# Patient Record
Sex: MALE | Race: WHITE | NOT HISPANIC OR LATINO | Employment: FULL TIME | URBAN - NONMETROPOLITAN AREA
[De-identification: names, ages, dates, MRNs, and addresses within clinical notes are randomized per-mention and may not be internally consistent; named-entity substitution may affect disease eponyms.]

---
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
## 2021-05-18 NOTE — PROGRESS NOTES
Jayson Barroso THALIA 2000     Office/Outpatient Visit    Visit Date: Sat, Oct 5, 2019 10:51 am    Provider: Mary Honeycutt N.P. (Assistant: Antonia Buck RN)    Location: [facility]        Electronically signed by Mary Honeycutt N.P. on  10/05/2019 11:38:56 AM                             SUBJECTIVE:        CC:     Mr. Barroso is a 19 year old White male.  Bump to left lower eyelid;         HPI:         PHQ-9 Depression Screening: Completed form scanned and in chart; Total Score 0     Jayson presents to clinic with c/o bump to left lower eyelid. Not painful. No vision changes. Has not noticed any changes to tear production. First noticed about one month ago. No treatments tried.     ROS:     CONSTITUTIONAL:  Negative for chills and fever.      EYES:  Positive for bump to left lower eyelid.   Negative for blurred vision, eye drainage, eye pain or photophobia.      E/N/T:  Negative for ear pain and sore throat.      CARDIOVASCULAR:  Negative for chest pain and palpitations.      RESPIRATORY:  Negative for dyspnea and frequent wheezing.      NEUROLOGICAL:  Negative for dizziness and headaches.      PSYCHIATRIC:  Negative for depression and suicidal thoughts.          PMH/FMH/SH:     Last Reviewed on 10/05/2019 11:09 AM by Mary Honeycutt    Past Medical History:             PAST MEDICAL HISTORY         Asthma: no problems for many years;     Allergies         Surgical History:         Other Surgeries:    rhinoplasty for nasal fracture;    wisdom teeth removal;         Family History:     Father: Myocardial Infarction     Mother: Healthy         Social History:     Occupation: [employer]. Student at [employer];     Marital Status: Single         Tobacco/Alcohol/Supplements:     Last Reviewed on 10/05/2019 10:58 AM by Antonia Buck    Tobacco: He has never smoked.  Non-drinker             Current Problems:     Last Reviewed on 6/04/2014 04:00 PM by Nguyen Henry    Other juvenile apophysitis      Intrinsic asthma, unspecified     Chalazion     Screening for depression         Immunizations:     DTaP 2001     DTaP 2001     DTaP 2001     DTaP 10/14/2002     DTaP 6/7/2005     Hib PRP-OMP (3-dose) 2001     Hib PRP-OMP (3-dose) 7/3/2002     Comvax (Hib-HepB) 2001     Hep B (pedi/adol, 3-dose schedule) 2001     Hep B (pedi/adol, 3-dose schedule) 7/3/2002     Menveo 6/15/2012     IPV  Poliovirus, inactivated 2001     IPV  Poliovirus, inactivated 2001     IPV  Poliovirus, inactivated 7/3/2002     IPV  Poliovirus, inactivated 6/7/2005     MMR  (Measles-Mumps-Rubella), live 10/14/2003     MMR  (Measles-Mumps-Rubella), live 10/7/2005     Varicella, live 7/3/2002     Varicella, live 5/5/2011     Prevnar (Pneumococcal PCV 7) 2001     Prevnar (Pneumococcal PCV 7) 2001     Prevnar (Pneumococcal PCV 7) 1/8/2002     Flulaval 12/26/2013     Fluzone (3 + years dose) 11/15/2006     Fluzone pf (3+ years dose) 10/5/2019     Influenza A (H1N1), IM (3+ years) Monovalent 11/4/2009     Influenza, split virus (3+ years dose) 10/7/2010     FluMist 11/29/2005     FluMist 11/21/2007     FluMist 11/5/2008     Adacel (Tdap) 6/15/2012         Allergies:     Last Reviewed on 10/05/2019 10:57 AM by Antonia Buck      No Known Drug Allergies.         Current Medications:     Last Reviewed on 10/05/2019 10:57 AM by Antonia Buck    Zyrtec 10mg Tablet Take 1 tab(s) by mouth qd         OBJECTIVE:        Vitals:         Current: 10/5/2019 11:00:34 AM    Ht:  5 ft, 8 in (30.64%);  Wt: 168.2 lbs (75.08%);  BMI: 25.6    T: 97.9 F (oral);  BP: 145/69 mm Hg (left arm, sitting);  P: 70 bpm (right arm (BP Cuff), sitting)        Exams:     PHYSICAL EXAM:     GENERAL: well developed, well nourished;  no apparent distress;     EYES: left lower lid chalazion;  extraocular movements intact; mildly pink bilateral conjunctival;  pupils and irises are normal;     RESPIRATORY: normal respiratory rate and  pattern with no distress; normal breath sounds with no rales, rhonchi, wheezes or rubs;     CARDIOVASCULAR: normal rate; rhythm is regular;     MUSCULOSKELETAL: normal gait;     NEUROLOGIC: mental status: alert and oriented x 3; GROSSLY INTACT     PSYCHIATRIC: appropriate affect and demeanor;         Lab/Test Results:     AUDIO AND VISUAL SCREENING     Vision Testing: Far Right 20/15 Left 20/13 Bilateral 20/13;         Procedures:     Vaccination against other viral diseases, Influenza     1. Influenza, seasonal PF (children 3 years to adult): 0.5 ml unit dose given IM in the right upper arm; administered by [facility] Regarding contraindications to an Influenza vaccine: Denies moderate/severe illness with/without fever; serious reaction to eggs, egg proteins, gentamicin, gelatin, arginine, neomycin or polymixin; serious reaction after recieving previous influenza vaccines; and history of Guillain-Parks Syndrome.              ASSESSMENT           373.2   H00.19  Chalazion              DDx:     V04.81   Z23  Vaccination against other viral diseases, Influenza              DDx:     V79.0   Z13.31  Screening for depression              DDx:         ORDERS:         Meds Prescribed:       Erythromycin Ophthalmic 0.5% Ophthalmic Ointment Apply 1/2 inch ribbon of ointment to affected eye(s) qid x 7 days  #3.5 (Three point Five) gm Refills: 0         Procedures Ordered:       70197  Immunization administration; one vaccine  (In-House)         11605  Screening test of visual acuity, quantitative, bilateral  (In-House)           Other Orders:       59837  Influenza virus vaccine, quadrivalent, split virus, preservative free 3 years of age & older  (In-House)           Depression screen negative  (In-House)                   PLAN:          Chalazion         RECOMMENDATIONS given include: Warm compresses to affected eye 4 times daily for 15 minutes with clean washcloth..  Will cover for bacterial infection with antibiotic  ointment. If no improvement with treatment after 1-2 weeks, recommend follow up with optometry. Be seen sooner for any concerning symptoms such as vision changes.     FOLLOW-UP: for Annual Checkup           Prescriptions:       Erythromycin Ophthalmic 0.5% Ophthalmic Ointment Apply 1/2 inch ribbon of ointment to affected eye(s) qid x 7 days  #3.5 (Three point Five) gm Refills: 0           Orders:       88843  Screening test of visual acuity, quantitative, bilateral  (In-House)            Vaccination against other viral diseases, Influenza           Orders:       42623  Immunization administration; one vaccine  (In-House)         53387  Influenza virus vaccine, quadrivalent, split virus, preservative free 3 years of age & older  (In-House)            Screening for depression     MIPS PHQ-9 Depression Screening: Completed form scanned and in chart; Total Score 0; Negative Depression Screen           Orders:         Depression screen negative  (In-House)               CHARGE CAPTURE           **Please note: ICD descriptions below are intended for billing purposes only and may not represent clinical diagnoses**        Primary Diagnosis:         373.2 Chalazion            H00.19    Chalazion unspecified eye, unspecified eyelid              Orders:          05524   Office visit - new pt, level 3  (In-House)             10209   Screening test of visual acuity, quantitative, bilateral  (In-House)           V04.81 Vaccination against other viral diseases, Influenza            Z23    Encounter for immunization              Orders:          67433   Immunization administration; one vaccine  (In-House)             38374   Influenza virus vaccine, quadrivalent, split virus, preservative free 3 years of age & older  (In-House)           V79.0 Screening for depression            Z13.31    Encounter for screening for depression              Orders:             Depression screen negative  (In-House)

## 2021-07-01 VITALS
SYSTOLIC BLOOD PRESSURE: 145 MMHG | HEIGHT: 68 IN | BODY MASS INDEX: 25.49 KG/M2 | WEIGHT: 168.2 LBS | DIASTOLIC BLOOD PRESSURE: 69 MMHG | TEMPERATURE: 97.9 F | HEART RATE: 70 BPM

## 2022-05-26 NOTE — PROGRESS NOTES
Chief Complaint        Lower abdominal pain, and diarrhea     History of Present Illness      Jayson Barroso is a 21 y.o. male who presents to [facility] as a new patient with a history of generalized abdominal pain, altered bowel habits, diarrhea, postprandial diarrhea, family history of colon cancer, family history of esophageal cancer and family history of celiac disease.  Patient reports his whole life that he has had postprandial diarrhea with IBS-like symptoms.  He reports in March that he developed a week of back pain, body aches nausea and diarrhea that worsened over a 1 week.  He was then seen in the emergency department at [facility].  Patient had a white blood cell count of 25.3 while in the emergency department.  Patient was diagnosed with COVID-19 while in the ER patient was given Toradol, Zofran and Dilaudid which provided relief of his pain.  Patient reports since that time he has not had abdominal pain but his bowel movements continued to be altered with 2-3 loose bowel movements per day.  He denies nocturnal diarrhea.  Patient denies fever, nausea, vomiting, weight loss, night sweats, melena, hematochezia, hematemesis.    CT abdomen and pelvis with contrast performed on 03/07/2022 unremarkable CT scan of the abdomen and pelvis.    Labs performed on 03/07/2022 positive for COVID, AST 17, ALT-50 , WBC-25.3, RBC 5.1, hemoglobin 15.5.    HIDA scan performed on 03/11/2022.  Normal.    Patient has never had a colonoscopy or EGD.     Results       Result Review :   The following data was reviewed by: JUAN Head on 05/27/2022                        Past Medical History       Past Medical History:   Diagnosis Date   • Allergies        Past Surgical History:   Procedure Laterality Date   • NOSE SURGERY           Current Outpatient Medications:   •  cetirizine (zyrTEC) 10 MG tablet, Take 10 mg by mouth Daily., Disp: , Rfl:   •  fluticasone (FLONASE) 50 MCG/ACT  "nasal spray, 2 sprays into the nostril(s) as directed by provider Daily., Disp: , Rfl:   •  dicyclomine (BENTYL) 20 MG tablet, Take 1 tablet by mouth Every 6 (Six) Hours., Disp: 90 tablet, Rfl: 3     No Known Allergies    Family History   Problem Relation Age of Onset   • Colon cancer Paternal Great-Grandfather         Social History     Social History Narrative   • Not on file       Objective       Objective     Vital Signs:   /63 (BP Location: Right arm, Patient Position: Sitting, Cuff Size: Adult)   Pulse 72   Ht 185.4 cm (73\")   Wt 76 kg (167 lb 9.6 oz)   SpO2 100%   BMI 22.11 kg/m²     Body mass index is 22.11 kg/m².    Physical Exam  Constitutional:       General: He is not in acute distress.     Appearance: Normal appearance. He is well-developed and normal weight.   Eyes:      Conjunctiva/sclera: Conjunctivae normal.      Pupils: Pupils are equal, round, and reactive to light.      Visual Fields: Right eye visual fields normal and left eye visual fields normal.   Cardiovascular:      Rate and Rhythm: Normal rate and regular rhythm.      Heart sounds: Normal heart sounds.   Pulmonary:      Effort: Pulmonary effort is normal. No retractions.      Breath sounds: Normal breath sounds and air entry.      Comments: Inspection of chest: normal appearance  Abdominal:      General: Bowel sounds are normal.      Palpations: Abdomen is soft.      Tenderness: There is no abdominal tenderness.      Comments: No appreciable hepatosplenomegaly   Musculoskeletal:      Cervical back: Neck supple.      Right lower leg: No edema.      Left lower leg: No edema.   Lymphadenopathy:      Cervical: No cervical adenopathy.   Skin:     Findings: No lesion.      Comments: Turgor normal   Neurological:      Mental Status: He is alert and oriented to person, place, and time.   Psychiatric:         Mood and Affect: Mood and affect normal.              Assessment & Plan          Assessment and Plan    Diagnoses and all orders " for this visit:    1. Generalized abdominal pain (Primary)  -     CBC & Differential; Future  -     Comprehensive Metabolic Panel; Future  -     Iron Profile; Future    2. Altered bowel habits  -     CBC & Differential; Future  -     Comprehensive Metabolic Panel; Future  -     Iron Profile; Future    3. Diarrhea, unspecified type  -     CBC & Differential; Future  -     Comprehensive Metabolic Panel; Future  -     Iron Profile; Future    4. Family history of colon cancer    5. Family history of esophageal cancer    6. Family history of celiac disease    Other orders  -     dicyclomine (BENTYL) 20 MG tablet; Take 1 tablet by mouth Every 6 (Six) Hours.  Dispense: 90 tablet; Refill: 3      21-year-old male presenting the office today as a new patient with a history of generalized abdominal pain, altered bowel habits, diarrhea, postprandial diarrhea, family history of colon cancer, family history of esophageal cancer and family history of celiac disease.  I have recommended that the patient undergo further evaluation with an EGD and colonoscopy.  I have discussed this procedure in detail with the patient.  I have discussed the risks, benefits and alternatives.  I have discussed the risk of anesthesia, bleeding and perforation.  Patient understands these risks, benefits and alternatives and wishes to proceed.  I will schedule him at his earliest convenience.  I have prescribed Bentyl 20 mg to use as needed for abdominal pain and altered bowel habits.  I have also recommended IBgard over-the-counter for use for IBS symptoms.  I have ordered repeat CBC along with a CMP and iron profile.  Patient will follow-up in the office after endoscopy.  Patient agreeable to this plan will call with any questions or concerns.            Follow Up       Follow Up   Return for Follow up after endoscopy in office.  Patient was given instructions and counseling regarding his condition or for health maintenance advice. Please see specific  information pulled into the AVS if appropriate.

## 2022-05-27 ENCOUNTER — PREP FOR SURGERY (OUTPATIENT)
Dept: OTHER | Facility: HOSPITAL | Age: 22
End: 2022-05-27

## 2022-05-27 ENCOUNTER — OFFICE VISIT (OUTPATIENT)
Dept: GASTROENTEROLOGY | Facility: CLINIC | Age: 22
End: 2022-05-27

## 2022-05-27 VITALS
BODY MASS INDEX: 22.21 KG/M2 | SYSTOLIC BLOOD PRESSURE: 129 MMHG | WEIGHT: 167.6 LBS | DIASTOLIC BLOOD PRESSURE: 63 MMHG | HEART RATE: 72 BPM | OXYGEN SATURATION: 100 % | HEIGHT: 73 IN

## 2022-05-27 DIAGNOSIS — R19.7 DIARRHEA, UNSPECIFIED TYPE: ICD-10-CM

## 2022-05-27 DIAGNOSIS — Z83.79 FAMILY HISTORY OF CELIAC DISEASE: ICD-10-CM

## 2022-05-27 DIAGNOSIS — R19.4 ALTERED BOWEL HABITS: ICD-10-CM

## 2022-05-27 DIAGNOSIS — R10.84 GENERALIZED ABDOMINAL PAIN: Primary | ICD-10-CM

## 2022-05-27 DIAGNOSIS — Z80.0 FAMILY HISTORY OF COLON CANCER: ICD-10-CM

## 2022-05-27 DIAGNOSIS — Z80.0 FAMILY HISTORY OF ESOPHAGEAL CANCER: ICD-10-CM

## 2022-05-27 PROCEDURE — 99214 OFFICE O/P EST MOD 30 MIN: CPT | Performed by: NURSE PRACTITIONER

## 2022-05-27 RX ORDER — DICYCLOMINE HCL 20 MG
20 TABLET ORAL EVERY 6 HOURS
Qty: 90 TABLET | Refills: 3 | Status: SHIPPED | OUTPATIENT
Start: 2022-05-27 | End: 2022-05-27

## 2022-05-27 RX ORDER — CETIRIZINE HYDROCHLORIDE 10 MG/1
10 TABLET ORAL DAILY
COMMUNITY
End: 2022-12-20

## 2022-05-27 RX ORDER — DICYCLOMINE HCL 20 MG
TABLET ORAL
Qty: 368 TABLET | Refills: 1 | Status: SHIPPED | OUTPATIENT
Start: 2022-05-27

## 2022-05-27 RX ORDER — FLUTICASONE PROPIONATE 50 MCG
2 SPRAY, SUSPENSION (ML) NASAL DAILY
COMMUNITY
End: 2022-12-20

## 2022-05-27 NOTE — PATIENT INSTRUCTIONS
Diarrhea, Adult  Diarrhea is frequent loose and watery bowel movements. Diarrhea can make you feel weak and cause you to become dehydrated. Dehydration can make you tired and thirsty, cause you to have a dry mouth, and decrease how often you urinate.  Diarrhea typically lasts 2-3 days. However, it can last longer if it is a sign of something more serious. It is important to treat your diarrhea as told by your health care provider.  Follow these instructions at home:  Eating and drinking         Follow these recommendations as told by your health care provider:  Take an oral rehydration solution (ORS). This is an over-the-counter medicine that helps return your body to its normal balance of nutrients and water. It is found at pharmacies and retail stores.  Drink plenty of fluids, such as water, ice chips, diluted fruit juice, and low-calorie sports drinks. You can drink milk also, if desired.  Avoid drinking fluids that contain a lot of sugar or caffeine, such as energy drinks, sports drinks, and soda.  Eat bland, easy-to-digest foods in small amounts as you are able. These foods include bananas, applesauce, rice, lean meats, toast, and crackers.  Avoid alcohol.  Avoid spicy or fatty foods.    Medicines  Take over-the-counter and prescription medicines only as told by your health care provider.  If you were prescribed an antibiotic medicine, take it as told by your health care provider. Do not stop using the antibiotic even if you start to feel better.  General instructions    Wash your hands often using soap and water. If soap and water are not available, use a hand . Others in the household should wash their hands as well. Hands should be washed:  After using the toilet or changing a diaper.  Before preparing, cooking, or serving food.  While caring for a sick person or while visiting someone in a hospital.  Drink enough fluid to keep your urine pale yellow.  Rest at home while you recover.  Watch your  condition for any changes.  Take a warm bath to relieve any burning or pain from frequent diarrhea episodes.  Keep all follow-up visits as told by your health care provider. This is important.    Contact a health care provider if:  You have a fever.  Your diarrhea gets worse.  You have new symptoms.  You cannot keep fluids down.  You feel light-headed or dizzy.  You have a headache.  You have muscle cramps.  Get help right away if:  You have chest pain.  You feel extremely weak or you faint.  You have bloody or black stools or stools that look like tar.  You have severe pain, cramping, or bloating in your abdomen.  You have trouble breathing or you are breathing very quickly.  Your heart is beating very quickly.  Your skin feels cold and clammy.  You feel confused.  You have signs of dehydration, such as:  Dark urine, very little urine, or no urine.  Cracked lips.  Dry mouth.  Sunken eyes.  Sleepiness.  Weakness.  Summary  Diarrhea is frequent loose and watery bowel movements. Diarrhea can make you feel weak and cause you to become dehydrated.  Drink enough fluids to keep your urine pale yellow.  Make sure that you wash your hands after using the toilet. If soap and water are not available, use hand .  Contact a health care provider if your diarrhea gets worse or you have new symptoms.  Get help right away if you have signs of dehydration.  This information is not intended to replace advice given to you by your health care provider. Make sure you discuss any questions you have with your health care provider.  Document Revised: 05/05/2020 Document Reviewed: 05/24/2019  [employer] Patient Education © 2021 [employer]

## 2022-05-31 ENCOUNTER — LAB (OUTPATIENT)
Dept: LAB | Facility: HOSPITAL | Age: 22
End: 2022-05-31

## 2022-05-31 DIAGNOSIS — R19.4 ALTERED BOWEL HABITS: ICD-10-CM

## 2022-05-31 DIAGNOSIS — R10.84 GENERALIZED ABDOMINAL PAIN: ICD-10-CM

## 2022-05-31 DIAGNOSIS — R19.7 DIARRHEA, UNSPECIFIED TYPE: ICD-10-CM

## 2022-05-31 LAB
ALBUMIN SERPL-MCNC: 4.6 G/DL (ref 3.5–5.2)
ALBUMIN/GLOB SERPL: 1.6 G/DL
ALP SERPL-CCNC: 58 U/L (ref 39–117)
ALT SERPL W P-5'-P-CCNC: 11 U/L (ref 1–41)
ANION GAP SERPL CALCULATED.3IONS-SCNC: 9.9 MMOL/L (ref 5–15)
AST SERPL-CCNC: 15 U/L (ref 1–40)
BASOPHILS # BLD AUTO: 0.05 10*3/MM3 (ref 0–0.2)
BASOPHILS NFR BLD AUTO: 0.7 % (ref 0–1.5)
BILIRUB SERPL-MCNC: 0.4 MG/DL (ref 0–1.2)
BUN SERPL-MCNC: 17 MG/DL (ref 6–20)
BUN/CREAT SERPL: 19.1 (ref 7–25)
CALCIUM SPEC-SCNC: 9.9 MG/DL (ref 8.6–10.5)
CHLORIDE SERPL-SCNC: 101 MMOL/L (ref 98–107)
CO2 SERPL-SCNC: 26.1 MMOL/L (ref 22–29)
CREAT SERPL-MCNC: 0.89 MG/DL (ref 0.76–1.27)
DEPRECATED RDW RBC AUTO: 39.1 FL (ref 37–54)
EGFRCR SERPLBLD CKD-EPI 2021: 125 ML/MIN/1.73
EOSINOPHIL # BLD AUTO: 0.32 10*3/MM3 (ref 0–0.4)
EOSINOPHIL NFR BLD AUTO: 4.4 % (ref 0.3–6.2)
ERYTHROCYTE [DISTWIDTH] IN BLOOD BY AUTOMATED COUNT: 12.1 % (ref 12.3–15.4)
GLOBULIN UR ELPH-MCNC: 2.8 GM/DL
GLUCOSE SERPL-MCNC: 86 MG/DL (ref 65–99)
HCT VFR BLD AUTO: 43.7 % (ref 37.5–51)
HGB BLD-MCNC: 14.7 G/DL (ref 13–17.7)
IMM GRANULOCYTES # BLD AUTO: 0.03 10*3/MM3 (ref 0–0.05)
IMM GRANULOCYTES NFR BLD AUTO: 0.4 % (ref 0–0.5)
IRON 24H UR-MRATE: 70 MCG/DL (ref 59–158)
IRON SATN MFR SERPL: 21 % (ref 20–50)
LYMPHOCYTES # BLD AUTO: 1.49 10*3/MM3 (ref 0.7–3.1)
LYMPHOCYTES NFR BLD AUTO: 20.3 % (ref 19.6–45.3)
MCH RBC QN AUTO: 29.8 PG (ref 26.6–33)
MCHC RBC AUTO-ENTMCNC: 33.6 G/DL (ref 31.5–35.7)
MCV RBC AUTO: 88.6 FL (ref 79–97)
MONOCYTES # BLD AUTO: 0.67 10*3/MM3 (ref 0.1–0.9)
MONOCYTES NFR BLD AUTO: 9.1 % (ref 5–12)
NEUTROPHILS NFR BLD AUTO: 4.78 10*3/MM3 (ref 1.7–7)
NEUTROPHILS NFR BLD AUTO: 65.1 % (ref 42.7–76)
PLATELET # BLD AUTO: 257 10*3/MM3 (ref 140–450)
PMV BLD AUTO: 9.2 FL (ref 6–12)
POTASSIUM SERPL-SCNC: 4.1 MMOL/L (ref 3.5–5.2)
PROT SERPL-MCNC: 7.4 G/DL (ref 6–8.5)
RBC # BLD AUTO: 4.93 10*6/MM3 (ref 4.14–5.8)
SODIUM SERPL-SCNC: 137 MMOL/L (ref 136–145)
TIBC SERPL-MCNC: 340 MCG/DL (ref 298–536)
TRANSFERRIN SERPL-MCNC: 228 MG/DL (ref 200–360)
WBC NRBC COR # BLD: 7.34 10*3/MM3 (ref 3.4–10.8)

## 2022-05-31 PROCEDURE — 83540 ASSAY OF IRON: CPT

## 2022-05-31 PROCEDURE — 84466 ASSAY OF TRANSFERRIN: CPT

## 2022-05-31 PROCEDURE — 36415 COLL VENOUS BLD VENIPUNCTURE: CPT

## 2022-05-31 PROCEDURE — 80053 COMPREHEN METABOLIC PANEL: CPT

## 2022-05-31 PROCEDURE — 85025 COMPLETE CBC W/AUTO DIFF WBC: CPT

## 2022-06-02 ENCOUNTER — PATIENT ROUNDING (BHMG ONLY) (OUTPATIENT)
Dept: GASTROENTEROLOGY | Facility: CLINIC | Age: 22
End: 2022-06-02

## 2022-06-02 NOTE — PROGRESS NOTES
6/2/2022      Hello, may I speak with Jayson Barroso     My name is Claire, Management Fellow. I am calling from [facility] [place].    Before we get started may I verify your date of birth? 2000    I am calling to officially welcome you to our practice and ask about your recent visit. Is this a good time to talk?  Yes    Tell me about your visit with us. What things went well?  Everyone was very welcoming and willing to listen.          We're always looking for ways to make our patients' experiences even better. Do you have recommendations on ways we may improve?  No ma'am.    Overall were you satisfied with your first visit to our practice?  Yes ma'am.    I appreciate you taking the time to speak with me today. Is there anything else I can do for you?  No ma'am.    We would really appreciate you completing a survey if you receive one in the mail or via email.       Thank you, and have a great day.

## 2022-06-07 ENCOUNTER — TELEPHONE (OUTPATIENT)
Dept: GASTROENTEROLOGY | Facility: CLINIC | Age: 22
End: 2022-06-07

## 2022-06-07 NOTE — TELEPHONE ENCOUNTER
----- Message from JUAN Head sent at 6/2/2022  8:01 AM EDT -----  Normal CMP with normal glucose, kidney function, electrolytes and liver function.  Normal iron profile.

## 2022-07-20 ENCOUNTER — TELEPHONE (OUTPATIENT)
Dept: GASTROENTEROLOGY | Facility: CLINIC | Age: 22
End: 2022-07-20

## 2022-07-20 RX ORDER — SOD SULF/POT CHLORIDE/MAG SULF 1.479 G
12 TABLET ORAL TAKE AS DIRECTED
Qty: 24 TABLET | Refills: 0 | Status: SHIPPED | OUTPATIENT
Start: 2022-07-20 | End: 2022-07-20

## 2022-07-20 RX ORDER — SOD SULF/POT CHLORIDE/MAG SULF 1.479 G
12 TABLET ORAL TAKE AS DIRECTED
Qty: 24 TABLET | Refills: 0 | Status: SHIPPED | OUTPATIENT
Start: 2022-07-20 | End: 2022-12-20

## 2022-07-25 NOTE — PRE-PROCEDURE INSTRUCTIONS
PT'S MOTHER INSTRUCTED ON CLEAR LIQ DIET AND PO SPLIT PREP LAST BM SHOULD BE CLEAR  PT CAN TAKE ZYRTEC   WITH A SIP OF WATER IN THE AM OF THE PROCEDURE ARRIVAL TIME IS 1000 AM  ON 7/26/22

## 2022-07-26 ENCOUNTER — HOSPITAL ENCOUNTER (OUTPATIENT)
Facility: HOSPITAL | Age: 22
Setting detail: HOSPITAL OUTPATIENT SURGERY
Discharge: HOME | End: 2022-07-26
Attending: INTERNAL MEDICINE | Admitting: INTERNAL MEDICINE

## 2022-07-26 ENCOUNTER — ANESTHESIA EVENT (OUTPATIENT)
Dept: GASTROENTEROLOGY | Facility: HOSPITAL | Age: 22
End: 2022-07-26

## 2022-07-26 ENCOUNTER — ANESTHESIA (OUTPATIENT)
Dept: GASTROENTEROLOGY | Facility: HOSPITAL | Age: 22
End: 2022-07-26

## 2022-07-26 VITALS
OXYGEN SATURATION: 100 % | TEMPERATURE: 98 F | BODY MASS INDEX: 22.85 KG/M2 | WEIGHT: 172.4 LBS | HEART RATE: 62 BPM | RESPIRATION RATE: 16 BRPM | HEIGHT: 73 IN | DIASTOLIC BLOOD PRESSURE: 70 MMHG | SYSTOLIC BLOOD PRESSURE: 117 MMHG

## 2022-07-26 DIAGNOSIS — R10.84 GENERALIZED ABDOMINAL PAIN: ICD-10-CM

## 2022-07-26 DIAGNOSIS — Z83.79 FAMILY HISTORY OF CELIAC DISEASE: ICD-10-CM

## 2022-07-26 DIAGNOSIS — Z80.0 FAMILY HISTORY OF ESOPHAGEAL CANCER: ICD-10-CM

## 2022-07-26 DIAGNOSIS — R19.7 DIARRHEA, UNSPECIFIED TYPE: ICD-10-CM

## 2022-07-26 DIAGNOSIS — R19.4 ALTERED BOWEL HABITS: ICD-10-CM

## 2022-07-26 DIAGNOSIS — Z80.0 FAMILY HISTORY OF COLON CANCER: ICD-10-CM

## 2022-07-26 PROCEDURE — 45385 COLONOSCOPY W/LESION REMOVAL: CPT | Performed by: INTERNAL MEDICINE

## 2022-07-26 PROCEDURE — 25010000002 PROPOFOL 10 MG/ML EMULSION: Performed by: NURSE ANESTHETIST, CERTIFIED REGISTERED

## 2022-07-26 PROCEDURE — 43239 EGD BIOPSY SINGLE/MULTIPLE: CPT | Performed by: INTERNAL MEDICINE

## 2022-07-26 PROCEDURE — 88305 TISSUE EXAM BY PATHOLOGIST: CPT | Performed by: INTERNAL MEDICINE

## 2022-07-26 PROCEDURE — 45380 COLONOSCOPY AND BIOPSY: CPT | Performed by: INTERNAL MEDICINE

## 2022-07-26 RX ORDER — PROPOFOL 10 MG/ML
VIAL (ML) INTRAVENOUS AS NEEDED
Status: DISCONTINUED | OUTPATIENT
Start: 2022-07-26 | End: 2022-07-26 | Stop reason: SURG

## 2022-07-26 RX ORDER — LIDOCAINE HYDROCHLORIDE 20 MG/ML
INJECTION, SOLUTION EPIDURAL; INFILTRATION; INTRACAUDAL; PERINEURAL AS NEEDED
Status: DISCONTINUED | OUTPATIENT
Start: 2022-07-26 | End: 2022-07-26 | Stop reason: SURG

## 2022-07-26 RX ORDER — SODIUM CHLORIDE, SODIUM LACTATE, POTASSIUM CHLORIDE, CALCIUM CHLORIDE 600; 310; 30; 20 MG/100ML; MG/100ML; MG/100ML; MG/100ML
30 INJECTION, SOLUTION INTRAVENOUS CONTINUOUS
Status: DISCONTINUED | OUTPATIENT
Start: 2022-07-26 | End: 2022-07-26 | Stop reason: HOSPADM

## 2022-07-26 RX ADMIN — SODIUM CHLORIDE, POTASSIUM CHLORIDE, SODIUM LACTATE AND CALCIUM CHLORIDE 30 ML/HR: 600; 310; 30; 20 INJECTION, SOLUTION INTRAVENOUS at 10:14

## 2022-07-26 RX ADMIN — PROPOFOL 100 MG: 10 INJECTION, EMULSION INTRAVENOUS at 10:38

## 2022-07-26 RX ADMIN — LIDOCAINE HYDROCHLORIDE 60 MG: 20 INJECTION, SOLUTION EPIDURAL; INFILTRATION; INTRACAUDAL; PERINEURAL at 10:38

## 2022-07-26 RX ADMIN — PROPOFOL 50 MG: 10 INJECTION, EMULSION INTRAVENOUS at 10:43

## 2022-07-26 RX ADMIN — PROPOFOL 150 MCG/KG/MIN: 10 INJECTION, EMULSION INTRAVENOUS at 10:38

## 2022-07-26 NOTE — H&P
"Pre Procedure History & Physical    Chief Complaint:   Abdominal pain  Altered bowel habits    Subjective     HPI:   As above    Past Medical History:   Past Medical History:   Diagnosis Date   • Allergies        Past Surgical History:  Past Surgical History:   Procedure Laterality Date   • NOSE SURGERY     • WISDOM TOOTH EXTRACTION         Family History:  Family History   Problem Relation Age of Onset   • Colon cancer Paternal Great-Grandfather    • Malig Hyperthermia Neg Hx        Social History:   reports that he has never smoked. He has never used smokeless tobacco. He reports current alcohol use. He reports that he does not use drugs.    Medications:   Medications Prior to Admission   Medication Sig Dispense Refill Last Dose   • Carboxymethylcellulose Sodium (EYE DROPS OP) Apply 1 drop to eye(s) as directed by provider Daily.   7/25/2022 at Unknown time   • cetirizine (zyrTEC) 10 MG tablet Take 10 mg by mouth Daily.   Past Week at Unknown time   • dicyclomine (BENTYL) 20 MG tablet TAKE 1 TABLET BY MOUTH EVERY 6 HOURS (Patient taking differently: Take 20 mg by mouth Every 6 (Six) Hours. NOT TAKING) 368 tablet 1 Past Week at Unknown time   • fluticasone (FLONASE) 50 MCG/ACT nasal spray 2 sprays into the nostril(s) as directed by provider Daily.   7/25/2022 at Unknown time   • Sodium Sulfate-Mag Sulfate-KCl (Sutab) 8051-482-042 MG tablet Take 12 tablets by mouth Take As Directed. 24 tablet 0 7/26/2022 at Unknown time       Allergies:  Patient has no known allergies.        Objective     Blood pressure 137/91, pulse 83, temperature 97.9 °F (36.6 °C), temperature source Temporal, resp. rate 18, height 185.4 cm (72.99\"), weight 78.2 kg (172 lb 6.4 oz), SpO2 100 %.    Physical Exam   Constitutional: Pt is oriented to person, place, and time and well-developed, well-nourished, and in no distress.   Mouth/Throat: Oropharynx is clear and moist.   Neck: Normal range of motion.   Cardiovascular: Normal rate, regular " rhythm and normal heart sounds.    Pulmonary/Chest: Effort normal and breath sounds normal.   Abdominal: Soft. Nontender  Skin: Skin is warm and dry.   Psychiatric: Mood, memory, affect and judgment normal.     Assessment & Plan     Diagnosis:  Abdominal pain, generalized  Change in bowel habits  diarrhea    Anticipated Surgical Procedure:  egd  colonoscopy    The risks, benefits, and alternatives of this procedure have been discussed with the patient or the responsible party- the patient understands and agrees to proceed.

## 2022-07-26 NOTE — ANESTHESIA POSTPROCEDURE EVALUATION
Patient: Jayson Barroso    Procedure Summary     Date: 07/26/22 Room / Location: [facility] END[facility]COPY 2 / [facility]    Anesthesia Start: 1033 Anesthesia Stop: 1108    Procedures:       ESOPHAGOGASTRODUODENOSCOPY WITH BIOPSIES (N/A )      COLONOSCOPY WITH COLD SNARE POLYPECTOMY AND RANDOM COLON BIOPSIES (N/A ) Diagnosis:       Generalized abdominal pain      Altered bowel habits      Diarrhea, unspecified type      Family history of colon cancer      Family history of esophageal cancer      Family history of celiac disease      (Generalized abdominal pain [R10.84])      (Altered bowel habits [R19.4])      (Diarrhea, unspecified type [R19.7])      (Family history of colon cancer [Z80.0])      (Family history of esophageal cancer [Z80.0])      (Family history of celiac disease [Z83.79])    Surgeons: Ronald Oquendo MD Provider: Chelita Kuo DO    Anesthesia Type: general ASA Status: 1          Anesthesia Type: general    Vitals  Vitals Value Taken Time   /70 07/26/22 1128   Temp 36.7 °C (98 °F) 07/26/22 1107   Pulse 60 07/26/22 1129   Resp 16 07/26/22 1128   SpO2 100 % 07/26/22 1129   Vitals shown include unvalidated device data.        Post Anesthesia Care and Evaluation    Patient location during evaluation: bedside  Patient participation: complete - patient participated  Level of consciousness: awake  Pain management: adequate    Airway patency: patent  Anesthetic complications: No anesthetic complications  PONV Status: none  Cardiovascular status: acceptable and stable  Respiratory status: acceptable  Hydration status: acceptable    Comments: An Anesthesiologist personally participated in the most demanding procedures (including induction and emergence if applicable) in the anesthesia plan, monitored the course of anesthesia administration at frequent intervals and remained physically present and available for immediate diagnosis and treatment of emergencies.

## 2022-07-26 NOTE — ANESTHESIA PREPROCEDURE EVALUATION
Anesthesia Evaluation     Patient summary reviewed and Nursing notes reviewed   no history of anesthetic complications:  NPO Solid Status: > 8 hours  NPO Liquid Status: < 2 hours           Airway   Mallampati: II  TM distance: >3 FB  No difficulty expected  Dental - normal exam     Pulmonary - negative pulmonary ROS and normal exam   Cardiovascular - negative cardio ROS and normal exam        Neuro/Psych- negative ROS  GI/Hepatic/Renal/Endo      ROS Comment: Abdominal pain    Musculoskeletal (-) negative ROS    Abdominal  - normal exam   Substance History - negative use     OB/GYN negative ob/gyn ROS         Other - negative ROS                       Anesthesia Plan    ASA 1     general     (Total IV Anesthesia    Patient understands anesthesia not responsible for dental damage.  )  intravenous induction     Anesthetic plan, risks, benefits, and alternatives have been provided, discussed and informed consent has been obtained with: patient.    Plan discussed with CRNA.        CODE STATUS:

## 2022-07-27 LAB
CYTO UR: NORMAL
LAB AP CASE REPORT: NORMAL
LAB AP CLINICAL INFORMATION: NORMAL
PATH REPORT.FINAL DX SPEC: NORMAL
PATH REPORT.GROSS SPEC: NORMAL

## 2022-11-04 ENCOUNTER — TELEPHONE (OUTPATIENT)
Dept: GASTROENTEROLOGY | Facility: CLINIC | Age: 22
End: 2022-11-04

## 2022-12-20 ENCOUNTER — OFFICE VISIT (OUTPATIENT)
Dept: FAMILY MEDICINE CLINIC | Age: 22
End: 2022-12-20

## 2022-12-20 VITALS
HEART RATE: 105 BPM | OXYGEN SATURATION: 100 % | TEMPERATURE: 98.6 F | BODY MASS INDEX: 21.81 KG/M2 | HEIGHT: 73 IN | DIASTOLIC BLOOD PRESSURE: 82 MMHG | WEIGHT: 164.6 LBS | SYSTOLIC BLOOD PRESSURE: 134 MMHG

## 2022-12-20 DIAGNOSIS — J34.89 RHINORRHEA: ICD-10-CM

## 2022-12-20 DIAGNOSIS — Z76.89 ENCOUNTER TO ESTABLISH CARE: Primary | ICD-10-CM

## 2022-12-20 DIAGNOSIS — R09.82 PND (POST-NASAL DRIP): ICD-10-CM

## 2022-12-20 PROCEDURE — 99213 OFFICE O/P EST LOW 20 MIN: CPT | Performed by: NURSE PRACTITIONER

## 2022-12-20 RX ORDER — CETIRIZINE HYDROCHLORIDE 10 MG/1
10 TABLET ORAL DAILY
Qty: 30 TABLET | Refills: 1 | Status: SHIPPED | OUTPATIENT
Start: 2022-12-20

## 2022-12-20 NOTE — PROGRESS NOTES
"Chief Complaint  Establish Care and Nasal Drainage (Pt states that as of yesterday he has had nasal drainage/)    Subjective          Jayson Barroso presents to [facility]  History of Present Illness  Here to establish care. It has been over 3 years since last seen by our practice.   URI   This is a new problem. The current episode started yesterday. There has been no fever. Associated symptoms include headaches (\"if so mild\"), a plugged ear sensation (\"maybe a little\"), rhinorrhea (clear), sneezing (\"a little\") and a sore throat (\"scratchy throat\" resolved). Pertinent negatives include no congestion, coughing, diarrhea, ear pain, nausea, sinus pain, swollen glands, vomiting or wheezing. Associated symptoms comments: Denies: myalgia. Treatments tried: Nyquil. The treatment provided mild relief.       Objective   Vital Signs:   /82 (BP Location: Left arm, Patient Position: Sitting)   Pulse 105   Temp 98.6 °F (37 °C) (Oral)   Ht 185.4 cm (72.99\")   Wt 74.7 kg (164 lb 9.6 oz)   SpO2 100% Comment: room air  BMI 21.72 kg/m²     Physical Exam  Constitutional:       Appearance: Normal appearance. He is normal weight.   HENT:      Head: Normocephalic.      Right Ear: Tympanic membrane, ear canal and external ear normal.      Left Ear: Tympanic membrane, ear canal and external ear normal.      Nose: Nose normal. No congestion.      Right Sinus: No maxillary sinus tenderness or frontal sinus tenderness.      Left Sinus: No maxillary sinus tenderness or frontal sinus tenderness.      Mouth/Throat:      Mouth: Mucous membranes are moist.      Pharynx: Oropharynx is clear. No oropharyngeal exudate or posterior oropharyngeal erythema.   Eyes:      Conjunctiva/sclera: Conjunctivae normal.      Pupils: Pupils are equal, round, and reactive to light.   Cardiovascular:      Rate and Rhythm: Normal rate and regular rhythm.      Pulses: Normal pulses.      Heart sounds: Normal heart sounds. "   Pulmonary:      Effort: Pulmonary effort is normal.      Breath sounds: Normal breath sounds.   Musculoskeletal:      Cervical back: Normal range of motion.   Lymphadenopathy:      Cervical: No cervical adenopathy.   Neurological:      Mental Status: He is alert and oriented to person, place, and time.   Psychiatric:         Mood and Affect: Mood normal.         Behavior: Behavior normal.         Thought Content: Thought content normal.        Result Review :   The following data was reviewed by: JUAN De León on 12/20/2022:                  Assessment and Plan    Diagnoses and all orders for this visit:    1. Encounter to establish care (Primary)    2. Rhinorrhea  -     cetirizine (zyrTEC) 10 MG tablet; Take 1 tablet by mouth Daily.  Dispense: 30 tablet; Refill: 1    3. PND (post-nasal drip)  -     cetirizine (zyrTEC) 10 MG tablet; Take 1 tablet by mouth Daily.  Dispense: 30 tablet; Refill: 1    I believe his symptoms may be more allergy related.  We will send in Zyrtec and see if that will help with his rhinorrhea.  If he develops fever and/or body aches, will put an order in for flu/COVID swab.      Follow Up   Return if symptoms worsen or fail to improve.  Patient was given instructions and counseling regarding his condition or for health maintenance advice. Please see specific information pulled into the AVS if appropriate.

## 2022-12-21 ENCOUNTER — CLINICAL SUPPORT (OUTPATIENT)
Dept: FAMILY MEDICINE CLINIC | Age: 22
End: 2022-12-21

## 2022-12-21 ENCOUNTER — TELEPHONE (OUTPATIENT)
Dept: FAMILY MEDICINE CLINIC | Age: 22
End: 2022-12-21

## 2022-12-21 DIAGNOSIS — J02.9 SORE THROAT: Primary | ICD-10-CM

## 2022-12-21 LAB
EXPIRATION DATE: ABNORMAL
EXPIRATION DATE: NORMAL
FLUAV AG UPPER RESP QL IA.RAPID: NOT DETECTED
FLUBV AG UPPER RESP QL IA.RAPID: NOT DETECTED
INTERNAL CONTROL: ABNORMAL
INTERNAL CONTROL: NORMAL
Lab: ABNORMAL
Lab: NORMAL
S PYO AG THROAT QL: NEGATIVE
SARS-COV-2 AG UPPER RESP QL IA.RAPID: DETECTED

## 2022-12-21 PROCEDURE — 87428 SARSCOV & INF VIR A&B AG IA: CPT | Performed by: NURSE PRACTITIONER

## 2022-12-21 PROCEDURE — 87081 CULTURE SCREEN ONLY: CPT | Performed by: NURSE PRACTITIONER

## 2022-12-21 PROCEDURE — 87880 STREP A ASSAY W/OPTIC: CPT | Performed by: NURSE PRACTITIONER

## 2022-12-21 NOTE — TELEPHONE ENCOUNTER
I placed an order for a strep, COVID, and flu swab, which can be done at his convenience. I may not be here to see the results, so if he's positive for strep, please forward results to on-call provider. Thanks.

## 2022-12-21 NOTE — TELEPHONE ENCOUNTER
Patient return for Covid, flu and strep. He was positive for Covid. Beatrice Lighton asked if you would look over and let us know if he would need to be treated if positive for anything. Please advise, thank you

## 2022-12-21 NOTE — TELEPHONE ENCOUNTER
Caller: Jayson Barroso    Relationship: Self    Best call back number: [phone]    What is the best time to reach you: ANY    Who are you requesting to speak with (clinical staff, provider,  specific staff member): CLINICAL    What was the call regarding: PATIENT STATES THAT HIS THROAT HAS GOTTEN WORSE FROM YESTERDAY. HE SAYS IT IS ALMOST UNBEARABLE PAIN.     Do you require a callback: YES

## 2022-12-23 LAB — BACTERIA SPEC AEROBE CULT: NORMAL

## 2023-02-28 ENCOUNTER — OFFICE VISIT (OUTPATIENT)
Dept: FAMILY MEDICINE CLINIC | Age: 23
End: 2023-02-28
Payer: COMMERCIAL

## 2023-02-28 VITALS
SYSTOLIC BLOOD PRESSURE: 136 MMHG | BODY MASS INDEX: 22.93 KG/M2 | WEIGHT: 173 LBS | HEART RATE: 97 BPM | HEIGHT: 73 IN | TEMPERATURE: 99.1 F | OXYGEN SATURATION: 100 % | DIASTOLIC BLOOD PRESSURE: 86 MMHG

## 2023-02-28 DIAGNOSIS — J02.9 SORE THROAT: Primary | ICD-10-CM

## 2023-02-28 DIAGNOSIS — R09.81 NASAL CONGESTION: ICD-10-CM

## 2023-02-28 LAB
EXPIRATION DATE: NORMAL
EXPIRATION DATE: NORMAL
FLUAV AG UPPER RESP QL IA.RAPID: NOT DETECTED
FLUBV AG UPPER RESP QL IA.RAPID: NOT DETECTED
INTERNAL CONTROL: NORMAL
INTERNAL CONTROL: NORMAL
Lab: NORMAL
Lab: NORMAL
S PYO AG THROAT QL: NEGATIVE
SARS-COV-2 AG UPPER RESP QL IA.RAPID: NOT DETECTED

## 2023-02-28 PROCEDURE — 87081 CULTURE SCREEN ONLY: CPT

## 2023-02-28 PROCEDURE — 87880 STREP A ASSAY W/OPTIC: CPT

## 2023-02-28 PROCEDURE — 99213 OFFICE O/P EST LOW 20 MIN: CPT

## 2023-02-28 PROCEDURE — 87428 SARSCOV & INF VIR A&B AG IA: CPT

## 2023-02-28 RX ORDER — AZELASTINE 1 MG/ML
1 SPRAY, METERED NASAL 2 TIMES DAILY
Qty: 30 ML | Refills: 0 | Status: SHIPPED | OUTPATIENT
Start: 2023-02-28

## 2023-03-01 RX ORDER — AZELASTINE 1 MG/ML
SPRAY, METERED NASAL
Qty: 90 ML | OUTPATIENT
Start: 2023-03-01

## 2023-03-02 LAB — BACTERIA SPEC AEROBE CULT: NORMAL

## 2023-03-06 ENCOUNTER — TELEPHONE (OUTPATIENT)
Dept: FAMILY MEDICINE CLINIC | Age: 23
End: 2023-03-06

## 2023-03-06 ENCOUNTER — OFFICE VISIT (OUTPATIENT)
Dept: FAMILY MEDICINE CLINIC | Age: 23
End: 2023-03-06
Payer: COMMERCIAL

## 2023-03-06 VITALS
WEIGHT: 174 LBS | HEART RATE: 79 BPM | BODY MASS INDEX: 23.06 KG/M2 | RESPIRATION RATE: 16 BRPM | SYSTOLIC BLOOD PRESSURE: 131 MMHG | HEIGHT: 73 IN | DIASTOLIC BLOOD PRESSURE: 66 MMHG

## 2023-03-06 DIAGNOSIS — H10.021 OTHER MUCOPURULENT CONJUNCTIVITIS OF RIGHT EYE: Primary | ICD-10-CM

## 2023-03-06 PROBLEM — H10.9 CONJUNCTIVITIS: Status: ACTIVE | Noted: 2023-03-06

## 2023-03-06 PROCEDURE — 99213 OFFICE O/P EST LOW 20 MIN: CPT | Performed by: NURSE PRACTITIONER

## 2023-03-06 RX ORDER — OLOPATADINE HYDROCHLORIDE 1 MG/ML
1 SOLUTION/ DROPS OPHTHALMIC 2 TIMES DAILY
COMMUNITY
End: 2023-03-08

## 2023-03-06 RX ORDER — ERYTHROMYCIN 5 MG/G
OINTMENT OPHTHALMIC EVERY 6 HOURS
Qty: 3.5 G | Refills: 0 | Status: SHIPPED | OUTPATIENT
Start: 2023-03-06

## 2023-03-06 RX ORDER — POLYMYXIN B SULFATE AND TRIMETHOPRIM 1; 10000 MG/ML; [USP'U]/ML
2 SOLUTION OPHTHALMIC 3 TIMES DAILY
Qty: 10 ML | Refills: 0 | Status: SHIPPED | OUTPATIENT
Start: 2023-03-06 | End: 2023-03-08

## 2023-03-06 NOTE — PROGRESS NOTES
Jayson Barroso presents to [facility].    Chief Complaint:  Eye Problem (Right eye, woke up this morning with gunk and crusty eye. Reports sent home from work. Is a  and uses allergy eye drops daily. )         History of Present Illness:  Right eye crusting   When did symptoms started this am  Any exposures: he is a   Wears corrective lens: none  Symptoms: red eye, and matted this am / no visual changes   Treatment tried: allergy eye drops    PMH changes:    Surgery:     EGD/ CLN 7-2022  Nasal surgery  [place] teeth extraction    Family history :     Father: DM  Mother:       Social:     Teacher at [employer]  Marital status: single  Children:  None       Review of Systems:  Review of Systems   Constitutional: Negative for fever.   HENT: Positive for congestion (last week, better now ).    Respiratory: Negative for cough and shortness of breath.    Cardiovascular: Negative for chest pain.          Current Outpatient Medications:   •  azelastine (ASTELIN) 0.1 % nasal spray, 1 spray into the nostril(s) as directed by provider 2 (Two) Times a Day. Use in each nostril as directed, Disp: 30 mL, Rfl: 0  •  cetirizine (zyrTEC) 10 MG tablet, Take 1 tablet by mouth Daily., Disp: 30 tablet, Rfl: 1  •  dicyclomine (BENTYL) 20 MG tablet, TAKE 1 TABLET BY MOUTH EVERY 6 HOURS (Patient taking differently: Take 1 tablet by mouth Every 6 (Six) Hours. NOT TAKING), Disp: 368 tablet, Rfl: 1  •  olopatadine (PATANOL) 0.1 % ophthalmic solution, 1 drop 2 (Two) Times a Day., Disp: , Rfl:   •  SAMBUCOL BLACK ELDERBERRY PO, Take  by mouth., Disp: , Rfl:   •  trimethoprim-polymyxin b (Polytrim) 15015-2.1 UNIT/ML-% ophthalmic solution, Administer 2 drops to the right eye 3 (Three) Times a Day., Disp: 10 mL, Rfl: 0    Vital Signs:   Vitals:    03/06/23 0928   BP: 131/66   BP Location: Right arm   Patient Position: Sitting   Cuff Size: Adult   Pulse: 79   Resp: 16   Weight: 78.9 kg (174 lb)  "  Height: 185.4 cm (72.99\")   PainSc: 0-No pain         Physical Exam:  Physical Exam  Constitutional:       General: He is not in acute distress.     Appearance: Normal appearance.   HENT:      Right Ear: Tympanic membrane, ear canal and external ear normal.      Left Ear: Tympanic membrane, ear canal and external ear normal.      Nose: Congestion present.      Mouth/Throat:      Pharynx: Oropharynx is clear. No posterior oropharyngeal erythema.   Eyes:      General:         Right eye: Discharge (none seen but erythema present ) present.   Cardiovascular:      Rate and Rhythm: Normal rate and regular rhythm.      Heart sounds: No murmur heard.  Pulmonary:      Effort: Pulmonary effort is normal.      Breath sounds: Normal breath sounds.   Lymphadenopathy:      Cervical: No cervical adenopathy.   Neurological:      Mental Status: He is alert.   Psychiatric:         Mood and Affect: Mood normal.         Behavior: Behavior normal.         Result Review      The following data was reviewed by: JUAN Harden on 03/06/2023:    Results for orders placed or performed in visit on 02/28/23   Beta Strep Culture, Throat - Swab, Throat    Specimen: Throat; Swab   Result Value Ref Range    Throat Culture, Beta Strep No Beta Hemolytic Streptococcus Isolated    POCT SARS-CoV-2 Antigen CYDNEY + Flu    Specimen: Swab   Result Value Ref Range    SARS Antigen Not Detected Not Detected, Presumptive Negative    Influenza A Antigen CYDNEY Not Detected Not Detected    Influenza B Antigen CYDNEY Not Detected Not Detected    Internal Control Passed Passed    Lot Number [id number]     Expiration Date 11,192,023    POCT rapid strep A    Specimen: Swab   Result Value Ref Range    Rapid Strep A Screen Negative Negative, VALID, INVALID, Not Performed    Internal Control Passed Passed    Lot Number [id number]     Expiration Date 5,312,024                Assessment and Plan:          Diagnoses and all orders for this visit:    1. Other mucopurulent " conjunctivitis of right eye (Primary)  Assessment & Plan:  Cover work for today, use warm compresses, continue his allergy treatments     Orders:  -     trimethoprim-polymyxin b (Polytrim) 34444-0.1 UNIT/ML-% ophthalmic solution; Administer 2 drops to the right eye 3 (Three) Times a Day.  Dispense: 10 mL; Refill: 0        Follow Up   Return if symptoms worsen or fail to improve.  Patient was given instructions and counseling regarding his condition or for health maintenance advice. Please see specific information pulled into the AVS if appropriate.

## 2023-03-06 NOTE — TELEPHONE ENCOUNTER
Pt said the eye medication A Butler sent to the pharmacy they dont have.  He said she could send it to [facility].

## 2023-03-07 ENCOUNTER — TELEPHONE (OUTPATIENT)
Dept: FAMILY MEDICINE CLINIC | Age: 23
End: 2023-03-07
Payer: COMMERCIAL

## 2023-03-07 NOTE — TELEPHONE ENCOUNTER
Caller: Jayson Barroso    Relationship: Self    Best call back number: [phone]    What medication are you requesting: EYE DROPS   What are your current symptoms: PINK EYE     How long have you been experiencing symptoms: A COUPLE     Have you had these symptoms before:    [x] Yes  [] No    Have you been treated for these symptoms before:   [x] Yes  [] No    If a prescription is needed, what is your preferred pharmacy and phone number:  PATIENT STATED HE HAS CALLED A COUPLE DIFFERENT LOCATIONS BUT ALL PHARMACIES ARE ON BACK ORDER. HE IS WANTING TO KNOW IF SANDI HAS ANY RECOMMENDATIONS FOR WHERE HE COULD GET THESE AT. HE STATED THE OINTMENT IS NOT HELPING AT ALL.

## 2023-03-07 NOTE — TELEPHONE ENCOUNTER
Pt just started the eye oint last night at 7pm.  He said he is not better and thinks it needs to be changed.  Explained to him that it will take longer than one day to improve.  Give it a little longer to see if it helps.

## 2023-03-08 ENCOUNTER — OFFICE VISIT (OUTPATIENT)
Dept: FAMILY MEDICINE CLINIC | Age: 23
End: 2023-03-08
Payer: COMMERCIAL

## 2023-03-08 VITALS
SYSTOLIC BLOOD PRESSURE: 129 MMHG | WEIGHT: 173 LBS | HEIGHT: 73 IN | BODY MASS INDEX: 22.93 KG/M2 | OXYGEN SATURATION: 100 % | HEART RATE: 76 BPM | TEMPERATURE: 98.2 F | DIASTOLIC BLOOD PRESSURE: 79 MMHG

## 2023-03-08 DIAGNOSIS — H10.33 ACUTE BACTERIAL CONJUNCTIVITIS OF BOTH EYES: Primary | ICD-10-CM

## 2023-03-08 PROCEDURE — 99213 OFFICE O/P EST LOW 20 MIN: CPT

## 2023-03-08 NOTE — PROGRESS NOTES
"Subjective     CHIEF COMPLAINT    Chief Complaint   Patient presents with   • Conjunctivitis     Pain in both eyes, was in left and now in the right      History of Present Illness  Patient is a 22-year-old male who presents to the clinic today with complaints of redness and discharge to his eyes.  He was seen in clinic on 3-6-2023 by Loyda Butler and was diagnosed with conjunctivitis of the right eye. he was prescribed Polytrim but then was informed by the pharmacy that this is on back order.  Loyda Butler then sent in erythromycin ointment for him.  He has been taking this since Monday night.  Reports that he woke up this morning and and his left eye is now red and also draining.  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  He does not wear contacts or glasses.  Denies any visual changes.  Denies fever, chills, cough or congestion, denies nausea or vomiting..  Denies any foreign body sensation.  States his eyes feel \"really itchy\" and \"weighed down.\"  His eyes are crusted shut in the mornings.    Review of Systems   Constitutional: Negative for chills and fever.   Eyes: Positive for discharge, redness and itching. Negative for photophobia and visual disturbance.   Respiratory: Negative for cough, shortness of breath and wheezing.    Cardiovascular: Negative for chest pain.   Gastrointestinal: Negative for nausea and vomiting.     No Known Allergies    Current Outpatient Medications on File Prior to Visit   Medication Sig Dispense Refill   • azelastine (ASTELIN) 0.1 % nasal spray 1 spray into the nostril(s) as directed by provider 2 (Two) Times a Day. Use in each nostril as directed 30 mL 0   • cetirizine (zyrTEC) 10 MG tablet Take 1 tablet by mouth Daily. 30 tablet 1   • dicyclomine (BENTYL) 20 MG tablet TAKE 1 TABLET BY MOUTH EVERY 6 HOURS (Patient taking differently: Take 1 tablet by mouth Every 6 (Six) Hours. NOT TAKING) 368 " "tablet 1   • erythromycin (ROMYCIN) 5 MG/GM ophthalmic ointment Administer  to the right eye Every 6 (Six) Hours. 3.5 g 0   • SAMBUCOL BLACK ELDERBERRY PO Take  by mouth.     • [DISCONTINUED] olopatadine (PATANOL) 0.1 % ophthalmic solution 1 drop 2 (Two) Times a Day.     • [DISCONTINUED] trimethoprim-polymyxin b (Polytrim) 05409-1.1 UNIT/ML-% ophthalmic solution Administer 2 drops to the right eye 3 (Three) Times a Day. (Patient taking differently: Administer 2 drops to the right eye 3 (Three) Times a Day. Is on the national back order) 10 mL 0     No current facility-administered medications on file prior to visit.     /79 (BP Location: Right arm, Patient Position: Sitting, Cuff Size: Adult)   Pulse 76   Temp 98.2 °F (36.8 °C) (Oral)   Ht 185.4 cm (72.99\")   Wt 78.5 kg (173 lb)   SpO2 100%   BMI 22.83 kg/m²     Objective     Physical Exam  Vitals and nursing note reviewed.   Constitutional:       General: He is not in acute distress.     Appearance: Normal appearance. He is not ill-appearing.   HENT:      Head: Normocephalic.   Eyes:      General:         Right eye: No discharge.         Left eye: Discharge present.     Extraocular Movements: Extraocular movements intact.      Conjunctiva/sclera:      Right eye: Right conjunctiva is injected. No hemorrhage.     Left eye: Left conjunctiva is injected. No hemorrhage.  Cardiovascular:      Rate and Rhythm: Normal rate and regular rhythm.      Heart sounds: Normal heart sounds. No murmur heard.  Pulmonary:      Effort: Pulmonary effort is normal. No accessory muscle usage or respiratory distress.      Breath sounds: Normal breath sounds. No wheezing or rhonchi.   Musculoskeletal:      Cervical back: Normal range of motion.   Skin:     General: Skin is warm and dry.   Neurological:      General: No focal deficit present.      Mental Status: He is alert and oriented to person, place, and time.   Psychiatric:         Mood and Affect: Mood and affect normal.    "      Behavior: Behavior normal.             Diagnoses and all orders for this visit:    1. Acute bacterial conjunctivitis of both eyes (Primary)      It appears that conjunctivitis has spread from the right eye to the left eye.  However given that patient does not feel he is seeing much improvement, recommend further evaluation at the eye doctor just to ensure there is nothing else going on. Patient is agreeable. We were able to get him scheduled with [facility] today at 9am. Will defer further treatment plan to [facility].     Follow-up:  Return if symptoms worsen or fail to improve.  Patient was given instructions and counseling regarding his condition or for health maintenance advice. Please see specific information pulled into the AVS if appropriate.

## 2024-02-01 ENCOUNTER — OFFICE VISIT (OUTPATIENT)
Dept: FAMILY MEDICINE CLINIC | Age: 24
End: 2024-02-01
Payer: COMMERCIAL

## 2024-02-01 VITALS
HEIGHT: 73 IN | OXYGEN SATURATION: 98 % | HEART RATE: 92 BPM | TEMPERATURE: 98.2 F | DIASTOLIC BLOOD PRESSURE: 74 MMHG | SYSTOLIC BLOOD PRESSURE: 128 MMHG | BODY MASS INDEX: 24.23 KG/M2 | WEIGHT: 182.8 LBS

## 2024-02-01 DIAGNOSIS — R68.83 CHILLS: ICD-10-CM

## 2024-02-01 DIAGNOSIS — R19.7 DIARRHEA, UNSPECIFIED TYPE: Primary | ICD-10-CM

## 2024-02-01 LAB
EXPIRATION DATE: NORMAL
FLUAV AG UPPER RESP QL IA.RAPID: NOT DETECTED
FLUBV AG UPPER RESP QL IA.RAPID: NOT DETECTED
INTERNAL CONTROL: NORMAL
Lab: NORMAL
SARS-COV-2 AG UPPER RESP QL IA.RAPID: NOT DETECTED

## 2024-02-01 PROCEDURE — 87428 SARSCOV & INF VIR A&B AG IA: CPT | Performed by: PHYSICIAN ASSISTANT

## 2024-02-01 PROCEDURE — 99213 OFFICE O/P EST LOW 20 MIN: CPT | Performed by: PHYSICIAN ASSISTANT

## 2024-02-01 RX ORDER — OLOPATADINE HYDROCHLORIDE 1 MG/ML
1 SOLUTION/ DROPS OPHTHALMIC 2 TIMES DAILY
COMMUNITY

## 2024-02-01 NOTE — PROGRESS NOTES
Subjective     CHIEF COMPLAINT    Chief Complaint   Patient presents with    Chills     Pt c/o chills, body aches, headache, sweats, diarrhea.    X 2 days               History of Present Illness  This is a 23-year-old male presenting the clinic complaining of diarrhea, body aches and headache for the last 2 days.  He had chills, sweats and subjective fevers the first night of his symptoms.  Today he has had abdominal cramping and diarrhea.  He continues to have a headache.  Denies any known sick contacts recently.  He is a .            Review of Systems   Constitutional:  Positive for chills and fatigue. Negative for fever.   HENT:  Negative for congestion, rhinorrhea and sore throat.    Respiratory:  Negative for cough.    Gastrointestinal:  Positive for abdominal pain and diarrhea. Negative for nausea and vomiting.   Musculoskeletal:  Positive for myalgias.   Neurological:  Positive for headaches.            Past Medical History:   Diagnosis Date    Allergies             Past Surgical History:   Procedure Laterality Date    COLONOSCOPY N/A 7/26/2022    Procedure: COLONOSCOPY WITH COLD SNARE POLYPECTOMY AND RANDOM COLON BIOPSIES;  Surgeon: Ronald Oquendo MD;  Location: [facility];  Service: Gastroenterology;  Laterality: N/A;  COLON POLYP    ENDOSCOPY N/A 7/26/2022    Procedure: ESOPHAGOGASTRODUODENOSCOPY WITH BIOPSIES;  Surgeon: Ronald Oquendo MD;  Location: [facility] END[facility];  Service: Gastroenterology;  Laterality: N/A;  NORMAL EGD    NOSE SURGERY      WISDOM TOOTH EXTRACTION              Family History   Problem Relation Age of Onset    Diabetes Father     Colon cancer Paternal Great-Grandfather     Malig Hyperthermia Neg Hx             Social History     Socioeconomic History    Marital status: Single   Tobacco Use    Smoking status: Never    Smokeless tobacco: Never    Tobacco comments:     NO SECOND HAND SMOKE EXPOSURE   Vaping Use    Vaping Use: Never used   Substance and  "Sexual Activity    Alcohol use: Yes     Comment: VERY RARELY    Drug use: Never    Sexual activity: Defer            No Known Allergies         Current Outpatient Medications on File Prior to Visit   Medication Sig Dispense Refill    cetirizine (zyrTEC) 10 MG tablet Take 1 tablet by mouth Daily. 30 tablet 1    olopatadine (Pataday) 0.1 % ophthalmic solution 1 drop 2 (Two) Times a Day.      azelastine (ASTELIN) 0.1 % nasal spray 1 spray into the nostril(s) as directed by provider 2 (Two) Times a Day. Use in each nostril as directed (Patient not taking: Reported on 2/1/2024) 30 mL 0    dicyclomine (BENTYL) 20 MG tablet TAKE 1 TABLET BY MOUTH EVERY 6 HOURS (Patient not taking: Reported on 2/1/2024) 368 tablet 1    SAMBUCOL BLACK ELDERBERRY PO Take  by mouth. (Patient not taking: Reported on 2/1/2024)      [DISCONTINUED] erythromycin (ROMYCIN) 5 MG/GM ophthalmic ointment Administer  to the right eye Every 6 (Six) Hours. (Patient not taking: Reported on 2/1/2024) 3.5 g 0     No current facility-administered medications on file prior to visit.            /74 (BP Location: Left arm, Patient Position: Sitting)   Pulse 92   Temp 98.2 °F (36.8 °C) (Oral)   Ht 185.4 cm (72.99\")   Wt 82.9 kg (182 lb 12.8 oz)   SpO2 98% Comment: room air  BMI 24.12 kg/m²          Objective     Physical Exam  Vitals and nursing note reviewed.   Constitutional:       General: He is not in acute distress.     Appearance: Normal appearance.   HENT:      Head: Normocephalic and atraumatic.   Eyes:      General: No scleral icterus.     Conjunctiva/sclera: Conjunctivae normal.   Cardiovascular:      Rate and Rhythm: Normal rate and regular rhythm.      Heart sounds: Normal heart sounds.   Pulmonary:      Effort: Pulmonary effort is normal. No respiratory distress.      Breath sounds: Normal breath sounds.   Abdominal:      General: Bowel sounds are normal. There is no distension.      Palpations: Abdomen is soft.      Tenderness: There is " no abdominal tenderness. There is no guarding or rebound.   Skin:     General: Skin is warm and dry.   Neurological:      Mental Status: He is alert and oriented to person, place, and time.   Psychiatric:         Mood and Affect: Mood normal.         Behavior: Behavior normal.                     Lab Results (last 24 hours)       Procedure Component Value Units Date/Time    POCT SARS-CoV-2 Antigen CYDNEY + Flu [[id number]] Collected: 02/01/24 1243    Specimen: Swab Updated: 02/01/24 1243     SARS Antigen Not Detected     Influenza A Antigen CYDNEY Not Detected     Influenza B Antigen CYDNEY Not Detected     Internal Control Passed     Lot Number [id number]     Expiration Date 9/14/24                      Assessment & Plan  Diarrhea, unspecified type  Jayson has not had diarrhea for less than 24 hours.  He has associated viral symptoms such as headache, body aches, subjective fever and chills.  Recommend supportive care with increased fluids and rest.  Provided note for work.  He is to contact the office if his symptoms do not resolve in 4 to 5 days or if he is feeling worse.  Chills      Orders Placed This Encounter   Procedures    POCT SARS-CoV-2 Antigen CYDNEY + Flu                   FOR FULL DISCHARGE INSTRUCTIONS/COMMENTS/HANDOUTS please see the   AVS

## 2024-02-01 NOTE — ASSESSMENT & PLAN NOTE
Jayson has not had diarrhea for less than 24 hours.  He has associated viral symptoms such as headache, body aches, subjective fever and chills.  Recommend supportive care with increased fluids and rest.  Provided note for work.  He is to contact the office if his symptoms do not resolve in 4 to 5 days or if he is feeling worse.

## 2024-02-01 NOTE — LETTER
February 1, 2024     Patient: Jayson Barroso   YOB: 2000   Date of Visit: 2/1/2024       To Whom It May Concern:    It is my medical opinion that Jayson Barroso may return to work in two days. If symptoms have improved he may return sooner.          Sincerely,          Maddie Valdes PA-C    CC: No Recipients

## 2024-02-02 ENCOUNTER — TELEPHONE (OUTPATIENT)
Dept: FAMILY MEDICINE CLINIC | Age: 24
End: 2024-02-02
Payer: COMMERCIAL

## 2024-02-02 NOTE — TELEPHONE ENCOUNTER
Caller: MARIPOSA LEE    Relationship to patient: Mother    Best call back number: [phone]    Patient is needing: PATIENT'S MOTHER CALLED IN AND IS REQUESTING A CALL BACK FROM CLINICAL STAFF. PATIENT WAS SEEN YESTERDAY AND IS NOT FEELING BETTER. PATIENT'S MOTHER WOULD LIKE TO KNOW IF HE COULD TAKE A ONDANSETRON ODT 4MG THAT HE HAD LEFT OVER FROM ANOTHER ILLNESS. PATIENT'S MOTHER WOULD ALSO LIKE TO KNOW IF HE CAN TAKE THAT WITH IMODIUM AND IBUPROFEN . PATIENT'S MOTHER SAID IT IS OKAY TO LEAVE MESSAGE ON PHONE

## 2024-02-02 NOTE — TELEPHONE ENCOUNTER
Spoke with mom who states pt is having a lot of loose stools, also c/o h/a. Was inf he most likely had a viral stomach bug at ov. Adv mom to hydrate pt and h/a should subside, also adv to try gatorade zero or powerade zero. Mother states she is giving him pedialyte and regular gatorade. Adv gatorade zero has no sugars which could be causing more diarrhea. Adv to try a bland or brat diet as well. She will try these things as well. Adv to stay away from ibuprofen with stomach issues as this could cause more upset stomach. She will try these things, was also inf normal stomach bugs are 24-48 hours and should start subsiding soon. She will call back with any concerns.

## 2024-02-05 NOTE — TELEPHONE ENCOUNTER
Agree with recommendations as below. If his symptoms persist, he can schedule a follow up visit with me or Beatrice. Thanks, [place]

## 2024-04-19 ENCOUNTER — TELEPHONE (OUTPATIENT)
Dept: FAMILY MEDICINE CLINIC | Age: 24
End: 2024-04-19
Payer: COMMERCIAL

## 2024-04-19 NOTE — TELEPHONE ENCOUNTER
Caller: Jayson Barroso    Relationship: Self    Best call back number: [phone]    What orders are you requesting (i.e. lab or imaging): ALLERGY TESTING     In what timeframe would the patient need to come in: AS SOON AS POSSIBLE     Additional notes: PATIENT STATED HE FEELS HIS ALLERGIES HAVE GOTTEN WORSE, WOULD LIKE TO BE TESTED.

## 2024-04-23 ENCOUNTER — OFFICE VISIT (OUTPATIENT)
Dept: FAMILY MEDICINE CLINIC | Age: 24
End: 2024-04-23
Payer: COMMERCIAL

## 2024-04-23 VITALS
BODY MASS INDEX: 25.31 KG/M2 | DIASTOLIC BLOOD PRESSURE: 90 MMHG | HEART RATE: 78 BPM | SYSTOLIC BLOOD PRESSURE: 132 MMHG | OXYGEN SATURATION: 99 % | WEIGHT: 191 LBS | HEIGHT: 73 IN

## 2024-04-23 DIAGNOSIS — L70.0 ACNE VULGARIS: ICD-10-CM

## 2024-04-23 DIAGNOSIS — Z91.09 ENVIRONMENTAL ALLERGIES: Primary | ICD-10-CM

## 2024-04-23 PROCEDURE — 99213 OFFICE O/P EST LOW 20 MIN: CPT | Performed by: NURSE PRACTITIONER

## 2024-04-23 RX ORDER — PREDNISOLONE ACETATE 10 MG/ML
SUSPENSION/ DROPS OPHTHALMIC
COMMUNITY
Start: 2024-04-19

## 2024-04-23 RX ORDER — CEPHALEXIN 500 MG/1
1 CAPSULE ORAL EVERY 12 HOURS SCHEDULED
COMMUNITY
Start: 2024-04-18

## 2024-04-23 NOTE — PROGRESS NOTES
"Chief Complaint  Referral (To an allergist and dermatologist. )    Subjective          Jayson Barroso presents to [facility]  History of Present Illness  He is wanting to get allergy testing. He is currently taking Zyrtec and Pataday. He can't use Flonase anymore due to nose bleeds. He feels that his allergies are pretty consistent, but will worsen during spring and fall. He feels that spring is worse.     He would like a referral to a dermatologist for acne.       Objective   Vital Signs:   /90   Pulse 78   Ht 185.4 cm (72.99\")   Wt 86.6 kg (191 lb)   SpO2 99% Comment: room air  BMI 25.20 kg/m²     Physical Exam  Constitutional:       General: He is not in acute distress.     Appearance: Normal appearance. He is normal weight.   HENT:      Head: Normocephalic.   Eyes:      Pupils: Pupils are equal, round, and reactive to light.      Visual Fields: Right eye visual fields normal and left eye visual fields normal.   Neck:      Trachea: Trachea normal.   Cardiovascular:      Rate and Rhythm: Normal rate and regular rhythm.      Heart sounds: Normal heart sounds.   Pulmonary:      Effort: Pulmonary effort is normal.      Breath sounds: Normal breath sounds and air entry.   Musculoskeletal:      Right lower leg: No edema.      Left lower leg: No edema.   Skin:     General: Skin is warm and dry.   Neurological:      Mental Status: He is alert and oriented to person, place, and time.   Psychiatric:         Mood and Affect: Mood and affect normal.         Behavior: Behavior normal.         Thought Content: Thought content normal.        Result Review :   The following data was reviewed by: JUAN De León on 04/23/2024:                  Assessment and Plan    Diagnoses and all orders for this visit:    1. Environmental allergies (Primary)  -     Ambulatory Referral to Allergy    2. Acne vulgaris  -     Ambulatory Referral to Dermatology    Referral placed to dermatology " and allergist.    Follow Up   Return if symptoms worsen or fail to improve.  Patient was given instructions and counseling regarding his condition or for health maintenance advice. Please see specific information pulled into the AVS if appropriate.

## 2025-03-26 ENCOUNTER — OFFICE VISIT (OUTPATIENT)
Dept: FAMILY MEDICINE CLINIC | Age: 25
End: 2025-03-26
Payer: COMMERCIAL

## 2025-03-26 VITALS
OXYGEN SATURATION: 96 % | DIASTOLIC BLOOD PRESSURE: 76 MMHG | HEIGHT: 73 IN | WEIGHT: 205.2 LBS | TEMPERATURE: 98.5 F | BODY MASS INDEX: 27.2 KG/M2 | SYSTOLIC BLOOD PRESSURE: 133 MMHG | HEART RATE: 99 BPM

## 2025-03-26 DIAGNOSIS — R05.1 ACUTE COUGH: ICD-10-CM

## 2025-03-26 DIAGNOSIS — J02.9 SORE THROAT: ICD-10-CM

## 2025-03-26 DIAGNOSIS — U07.1 COVID: Primary | ICD-10-CM

## 2025-03-26 PROCEDURE — 87081 CULTURE SCREEN ONLY: CPT | Performed by: STUDENT IN AN ORGANIZED HEALTH CARE EDUCATION/TRAINING PROGRAM

## 2025-03-26 RX ORDER — GUAIFENESIN 600 MG/1
1200 TABLET, EXTENDED RELEASE ORAL 2 TIMES DAILY
Qty: 40 TABLET | Refills: 0 | Status: SHIPPED | OUTPATIENT
Start: 2025-03-26

## 2025-03-26 RX ORDER — BROMPHENIRAMINE MALEATE, PSEUDOEPHEDRINE HYDROCHLORIDE, AND DEXTROMETHORPHAN HYDROBROMIDE 2; 30; 10 MG/5ML; MG/5ML; MG/5ML
5 SYRUP ORAL 4 TIMES DAILY PRN
Qty: 240 ML | Refills: 0 | Status: SHIPPED | OUTPATIENT
Start: 2025-03-26

## 2025-03-26 NOTE — PROGRESS NOTES
Chief Complaint     Headache, Cough, and Sore Throat    History of Present Illness     Jayson Barroso is a 24 y.o. male who presents to [facility] with complaints of not feeling well.       History of Present Illness  The patient is a 24-year-old male who presents for evaluation of headaches, chills, night sweats, congestion, and fatigue.    He has been experiencing persistent headaches, chills, night sweats, congestion, and fatigue for the past few days. He reports severe allergies, which he manages with weekly allergy injections and Zyrtec. He suspects his symptoms may be allergy-related due to the spring season. However, he noticed a change in the color of his nasal discharge to a thick green consistency, prompting him to seek medical attention. He experienced 2 episodes of chills and sweats this week, with the most severe episode occurring last night. He also reports sinus pressure, tension, and a constant headache. He has not recorded a fever but suspects he may have had one due to the sweats. He describes a sensation of his body feeling cold while his head feels hot. Two days ago, he experienced a severe sore throat, which he likens to the sensation of a knife in his throat, but this has since resolved, leaving him with a cough producing green sputum. He does not report any chest pain, shortness of breath, nausea, vomiting, or diarrhea. He is not currently using prednisone or Pataday eye drops and is not on Keflex. He attempted to alleviate his symptoms with DayQuil and NyQuil, but these did not provide relief. He also reports a history of earwax buildup since childhood. He is requesting a work note for tomorrow and Friday.    SOCIAL HISTORY  He works at a school.    MEDICATIONS  Zyrtec, DayQuil, NyQuil         History      Past Medical History:   Diagnosis Date    Allergies        Past Surgical History:   Procedure Laterality Date    COLONOSCOPY N/A 7/26/2022    Procedure:  COLONOSCOPY WITH COLD SNARE POLYPECTOMY AND RANDOM COLON BIOPSIES;  Surgeon: Ronald Oquendo MD;  Location: [facility] ENDOSCOPY;  Service: Gastroenterology;  Laterality: N/A;  COLON POLYP    ENDOSCOPY N/A 7/26/2022    Procedure: ESOPHAGOGASTRODUODENOSCOPY WITH BIOPSIES;  Surgeon: Ronald Oquendo MD;  Location: [facility] ENDOSCOPY;  Service: Gastroenterology;  Laterality: N/A;  NORMAL EGD    NOSE SURGERY      WISDOM TOOTH EXTRACTION         Family History   Problem Relation Age of Onset    Diabetes Father     Colon cancer Paternal Great-Grandfather     Malig Hyperthermia Neg Hx         Current Medications        Current Outpatient Medications:     ALLERGY SERUM INJECTION, Inject  under the skin into the appropriate area as directed 1 (One) Time Per Week., Disp: , Rfl:     cetirizine (zyrTEC) 10 MG tablet, Take 1 tablet by mouth Daily., Disp: 30 tablet, Rfl: 1    brompheniramine-pseudoephedrine-DM 30-2-10 MG/5ML syrup, Take 5 mL by mouth 4 (Four) Times a Day As Needed for Congestion or Cough., Disp: 240 mL, Rfl: 0    guaiFENesin (Mucinex) 600 MG 12 hr tablet, Take 2 tablets by mouth 2 (Two) Times a Day., Disp: 40 tablet, Rfl: 0     Allergies     No Known Allergies    Social History       Social History     Social History Narrative    Single, 0 children. Works at [employer]       Immunizations     Immunization:  Immunization History   Administered Date(s) Administered    COVID-19 (MODERNA) 1st,2nd,3rd Dose Monovalent 03/01/2021, 03/31/2021    DTaP, Unspecified 2001, 2001, 2001, 10/14/2002, 06/07/2005    FluMist 2-49yrs (Nasal) 09/10/2014    Fluzone (or Fluarix & Flulaval for VFC) >6mos 09/14/2020, 11/24/2021, 09/28/2022, 09/25/2023    Hep A, 2 Dose 04/20/2018, 11/14/2018    Hep B / HiB 2001, 07/03/2002    Hib (PRP-OMP) 2001    IPV 2001, 2001, 07/03/2002, 06/07/2005    Influenza Seasonal Injectable 12/26/2013    Influenza, Unspecified 12/26/2013,  "09/14/2020, 11/24/2021, 09/28/2022    MMR 10/14/2002, 06/07/2005    Meningococcal Conjugate 06/15/2017    PPD Test 11/24/2021    Trumenba(meningococcal B) 06/15/2017, 11/03/2020    Varicella 07/03/2002          Objective     Objective     Vital Signs:   /76 (BP Location: Right arm, Patient Position: Sitting)   Pulse 99   Temp 98.5 °F (36.9 °C) (Oral)   Ht 185.4 cm (73\")   Wt 93.1 kg (205 lb 3.2 oz)   SpO2 96% Comment: on room air  BMI 27.07 kg/m²       Physical Exam  Vitals and nursing note reviewed.   Constitutional:       Appearance: Normal appearance. He is overweight.   HENT:      Head: Normocephalic.      Right Ear: Tympanic membrane and external ear normal.      Left Ear: Tympanic membrane and external ear normal.      Ears:      Comments: Cerumen blocking view of bilateral TM's     Nose: Congestion present.      Mouth/Throat:      Lips: Pink.      Mouth: Mucous membranes are moist.      Pharynx: Uvula midline. Posterior oropharyngeal erythema present.   Eyes:      Conjunctiva/sclera: Conjunctivae normal.      Pupils: Pupils are equal, round, and reactive to light.   Cardiovascular:      Rate and Rhythm: Normal rate and regular rhythm.      Pulses: Normal pulses.      Heart sounds: Normal heart sounds.   Pulmonary:      Effort: Pulmonary effort is normal.      Breath sounds: Normal breath sounds.      Comments: Cough present  Abdominal:      General: Bowel sounds are normal.      Palpations: Abdomen is soft.   Musculoskeletal:         General: Normal range of motion.      Cervical back: Normal range of motion and neck supple.   Lymphadenopathy:      Cervical:      Right cervical: Superficial cervical adenopathy present.      Left cervical: No superficial cervical adenopathy.   Skin:     General: Skin is warm and dry.   Neurological:      General: No focal deficit present.      Mental Status: He is alert and oriented to person, place, and time.   Psychiatric:         Attention and Perception: " Attention normal.         Mood and Affect: Mood and affect normal.         Behavior: Behavior normal. Behavior is cooperative.         Physical Exam  Ears were examined.  Throat was examined.      Results    The following data was reviewed by: JUAN Aguilera on 03/28/25             Beta Strep Culture, Throat - Swab, Throat (03/26/2025 15:45)  POCT rapid strep A (03/26/2025 15:45)  POCT SARS-CoV-2 Antigen CYDNEY + Flu (03/26/2025 15:31)  Results  Laboratory Studies  Rapid strep negative, will send for culture.  Rapid COVID positive, negative for flu.       Assessment and Plan        Assessment and Plan       COVID  Due to age and overall health the nature of the patient will focus on symptom management.  Educated on the use of Tylenol and/or ibuprofen for headache, fever, body aches.  Educated importance of hydration.  Work note given.       Acute cough    Orders:    POCT SARS-CoV-2 Antigen CYDNEY + Flu    brompheniramine-pseudoephedrine-DM 30-2-10 MG/5ML syrup; Take 5 mL by mouth 4 (Four) Times a Day As Needed for Congestion or Cough.    guaiFENesin (Mucinex) 600 MG 12 hr tablet; Take 2 tablets by mouth 2 (Two) Times a Day.  Discussed the use of cough drops, Chloraseptic spray/lozenges, Vicks VapoRub, hot tea with honey, humidifier.  Sore throat    Orders:    POCT rapid strep A    Beta Strep Culture, Throat - , Throat; Future         Assessment & Plan  1. COVID-19.  The rapid strep test returned negative results, while the influenza test was negative but positive for COVID-19. Given his young age and overall good health, it is anticipated that his body will naturally overcome the infection. It was explained that the virus could take up to 14 days to fully resolve, during which time he may not feel completely recovered, but should notice gradual improvement. It was also discussed that COVID-19 symptoms can persist even after treatment with Paxlovid, particularly the cough, which some individuals experience for several  months post-infection. A prescription for Mucinex and cough syrup has been sent to the pharmacy. He has been advised to manage his symptoms with Tylenol or ibuprofen for any fever, body aches, or headaches. He has been instructed to maintain hydration by consuming ample water and to rest. A work note has been provided for tomorrow and Friday. He can resume social interactions once his symptoms have improved and he has been fever-free for 24 hours without the use of Tylenol or ibuprofen. The strep swab will be sent for culture to confirm the negative result.        Follow Up        Follow Up   No follow-ups on file.  Patient was given instructions and counseling regarding his condition or for health maintenance advice. Please see specific information pulled into the AVS if appropriate.      Patient or patient representative verbalized consent for the use of Ambient Listening during the visit with  JUAN Aguilera for chart documentation. 3/28/2025  09:06 EDT

## 2025-03-26 NOTE — LETTER
March 26, 2025     Patient: Jayson Barroso   YOB: 2000   Date of Visit: 3/26/2025       To Whom It May Concern:    It is my medical opinion that Jayson Barroso should be excused from work on 3/27/25 and 3/28/25.         Sincerely,        JUAN Aguilera    CC: No Recipients

## 2025-03-28 LAB — BACTERIA SPEC AEROBE CULT: NORMAL

## (undated) DEVICE — SOL IRRG H2O PL/BG 1000ML STRL

## (undated) DEVICE — THE SINGLE USE ETRAP – POLYP TRAP IS USED FOR SUCTION RETRIEVAL OF ENDOSCOPICALLY REMOVED POLYPS.: Brand: ETRAP

## (undated) DEVICE — SINGLE-USE BIOPSY FORCEPS: Brand: RADIAL JAW 4

## (undated) DEVICE — SNAR POLYP CAPTIFLEX XS/OVL 11X2.4MM 240CM 1P/U

## (undated) DEVICE — Device: Brand: DEFENDO AIR/WATER/SUCTION AND BIOPSY VALVE

## (undated) DEVICE — EGD OR ERCP KIT: Brand: MEDLINE INDUSTRIES, INC.

## (undated) DEVICE — COLON KIT: Brand: MEDLINE INDUSTRIES, INC.